# Patient Record
Sex: MALE | URBAN - METROPOLITAN AREA
[De-identification: names, ages, dates, MRNs, and addresses within clinical notes are randomized per-mention and may not be internally consistent; named-entity substitution may affect disease eponyms.]

---

## 2021-04-29 ENCOUNTER — APPOINTMENT (RX ONLY)
Dept: URBAN - METROPOLITAN AREA TELEMEDICINE 2 | Facility: TELEMEDICINE | Age: 46
Setting detail: DERMATOLOGY
End: 2021-04-29

## 2021-04-29 DIAGNOSIS — Z41.9 ENCOUNTER FOR PROCEDURE FOR PURPOSES OTHER THAN REMEDYING HEALTH STATE, UNSPECIFIED: ICD-10-CM

## 2021-04-29 PROCEDURE — ? COOLSCULPTING

## 2021-04-29 PROCEDURE — ? MEDICAL CONSULTATION: COOLSCULPTING

## 2021-04-29 PROCEDURE — ? RECOMMENDATIONS

## 2021-04-29 PROCEDURE — ? COSMETIC CONSULTATION: COOLSCULPTING

## 2021-04-29 ASSESSMENT — LOCATION DETAILED DESCRIPTION DERM
LOCATION DETAILED: RIGHT INFERIOR LATERAL MIDBACK
LOCATION DETAILED: RIGHT INFERIOR LATERAL MIDBACK
LOCATION DETAILED: RIGHT SUPERIOR LATERAL LOWER BACK
LOCATION DETAILED: LEFT INFERIOR MEDIAL MIDBACK
LOCATION DETAILED: RIGHT INFERIOR LATERAL MIDBACK
LOCATION DETAILED: LEFT SUPERIOR LATERAL LOWER BACK

## 2021-04-29 ASSESSMENT — LOCATION SIMPLE DESCRIPTION DERM
LOCATION SIMPLE: LEFT LOWER BACK
LOCATION SIMPLE: LEFT LOWER BACK
LOCATION SIMPLE: RIGHT LOWER BACK

## 2021-04-29 ASSESSMENT — LOCATION ZONE DERM
LOCATION ZONE: TRUNK

## 2021-04-29 NOTE — HPI: UNWANTED FAT
Are You Taking Immunosuppressants?: No
Additional History: * concerned with back fat\\n5/15 andJuly 14\\n*Hernia on abdomen

## 2021-04-29 NOTE — PROCEDURE: COOLSCULPTING
Suction Settings: The suction settings were per protocol.\\n*sucessful tx
Consent: Written consent obtained, risks reviewed including, but not limited to, blistering from suction, darker or lighter pigmentary change, bruising, and/or need for multiple treatments.
Intro: Prior to treatment, the area was cleaned with alcohol and marked out with a marking pen. The gel sheet was then applied uniformly. The applicator was applied to the skin with good contact and suction.
Suction Settings: The suction settings were per protocol.\\n*unSuccessful Tx. Skin was warm. Retreated on other machine
Device: Coolsculpting
Time (Minutes - Will Only Render If Nonzero): 35
Applicator Size: standard applicator
Suction Settings: The suction settings were per protocol.\\n*sucessful tx, skin tolerated treatment, pink in color
Suction Settings: The suction settings were per protocol.
Post Treatment: After treatment, the suction was turned off, and the applicator was removed from the skin.\\n\\n*pt.responded well, swelling, bruising, and erythema.\\n* warm towel, bruise moisturizer, body tight.
Patient Weight (Optional): 208
Time (Minutes - Will Only Render If Nonzero): 0
Price (Use Numbers Only, No Special Characters Or $): 431
Treatment Administered By (Optional): Sabra Mcdonald
Applicator Size: CoolAdvantage Core
Detail Level: Zone
Suction Settings: The suction settings were per protocol.\\n* Successful tx.
Applicator Size: CoolCore applicator
Applicator Size: CoolMini applicator
Start Time (Am Or Pm): 3:00
Price (Use Numbers Only, No Special Characters Or $): 4000
Start Time (Am Or Pm): 3:50
Stop Time (Am Or Pm): 3:35
Stop Time (Am Or Pm): 4:25
Applicator Size: CoolAdvantage Curve Plus
Location 1: upper flanks
Location 2: lower flanks

## 2021-05-27 ENCOUNTER — APPOINTMENT (RX ONLY)
Dept: URBAN - METROPOLITAN AREA TELEMEDICINE 2 | Facility: TELEMEDICINE | Age: 46
Setting detail: DERMATOLOGY
End: 2021-05-27

## 2021-05-27 DIAGNOSIS — Z41.9 ENCOUNTER FOR PROCEDURE FOR PURPOSES OTHER THAN REMEDYING HEALTH STATE, UNSPECIFIED: ICD-10-CM

## 2021-05-27 PROCEDURE — ? COOLSCULPTING

## 2021-05-27 ASSESSMENT — LOCATION SIMPLE DESCRIPTION DERM
LOCATION SIMPLE: LEFT ANTERIOR NECK
LOCATION SIMPLE: LEFT LOWER BACK
LOCATION SIMPLE: RIGHT LOWER BACK

## 2021-05-27 ASSESSMENT — LOCATION ZONE DERM
LOCATION ZONE: NECK
LOCATION ZONE: TRUNK

## 2021-05-27 ASSESSMENT — LOCATION DETAILED DESCRIPTION DERM
LOCATION DETAILED: LEFT SUPERIOR ANTERIOR NECK
LOCATION DETAILED: LEFT SUPERIOR LATERAL MIDBACK
LOCATION DETAILED: RIGHT INFERIOR LATERAL MIDBACK

## 2021-05-27 NOTE — PROCEDURE: COOLSCULPTING
Applicator Size: CoolMini applicator
Suction Settings: The suction settings were per protocol.
Suction Settings: The suction settings were per protocol.\\n*unSuccessful Tx. Thermal event
Suction Settings: The suction settings were per protocol.\\n*sucessful tx
Applicator Size: standard applicator
Location 1: flank (left)
Detail Level: Zone
Location 3: chin
Suction Settings: The suction settings were per protocol.\\n*sucessful tx, skin tolerated treatment, pink in color
Time (Minutes - Will Only Render If Nonzero): 35
Location 2: flank (right)
Treatment Administered By (Optional): Sabra Mcdonald
Post Treatment: After treatment, the suction was turned off, and the applicator was removed from the skin.\\n\\n*pt.responded well, swelling, bruising, and erythema.\\n* warm towel, bruise moisturizer, body tight.\\n* given CS bag, and gratis nectifirm
Device: Coolsculpting
Time (Minutes - Will Only Render If Nonzero): 45
Time (Minutes - Will Only Render If Nonzero): 0
Start Time (Am Or Pm): 10:00
Start Time (Am Or Pm): 11:00
Price (Use Numbers Only, No Special Characters Or $): 4000
Consent: Written consent obtained, risks reviewed including, but not limited to, blistering from suction, darker or lighter pigmentary change, bruising, and/or need for multiple treatments.
Stop Time (Am Or Pm): 10:35
Intro: Prior to treatment, the area was cleaned with alcohol and marked out with a marking pen. The gel sheet was then applied uniformly. The applicator was applied to the skin with good contact and suction.
Start Time (Am Or Pm): 11:50
Suction Settings: The suction settings were per protocol.\\n* Successful tx.
Stop Time (Am Or Pm): 12:25
Stop Time (Am Or Pm): 11:35
Applicator Size: CoolAdvantage Core

## 2021-06-27 NOTE — PROCEDURE: RECOMMENDATIONS
Recommendation Preamble: The following recommendations were made during the visit:
Detail Level: Zone
Pain - Patient was clinically upgraded due to pain.

## 2021-07-22 ENCOUNTER — APPOINTMENT (RX ONLY)
Dept: URBAN - METROPOLITAN AREA TELEMEDICINE 2 | Facility: TELEMEDICINE | Age: 46
Setting detail: DERMATOLOGY
End: 2021-07-22

## 2021-07-22 DIAGNOSIS — Z41.9 ENCOUNTER FOR PROCEDURE FOR PURPOSES OTHER THAN REMEDYING HEALTH STATE, UNSPECIFIED: ICD-10-CM

## 2021-07-22 PROCEDURE — ? COOLSCULPTING

## 2021-07-22 ASSESSMENT — LOCATION SIMPLE DESCRIPTION DERM
LOCATION SIMPLE: LEFT CHEEK
LOCATION SIMPLE: LEFT LOWER BACK
LOCATION SIMPLE: ABDOMEN
LOCATION SIMPLE: LEFT LOWER BACK
LOCATION SIMPLE: RIGHT LOWER BACK

## 2021-07-22 ASSESSMENT — LOCATION ZONE DERM
LOCATION ZONE: TRUNK
LOCATION ZONE: TRUNK
LOCATION ZONE: FACE

## 2021-07-22 ASSESSMENT — LOCATION DETAILED DESCRIPTION DERM
LOCATION DETAILED: RIGHT LATERAL ABDOMEN
LOCATION DETAILED: PERIUMBILICAL SKIN
LOCATION DETAILED: LEFT INFERIOR MEDIAL MALAR CHEEK
LOCATION DETAILED: LEFT INFERIOR LATERAL MIDBACK
LOCATION DETAILED: LEFT INFERIOR LATERAL MIDBACK
LOCATION DETAILED: RIGHT INFERIOR LATERAL MIDBACK

## 2021-07-22 NOTE — PROCEDURE: COOLSCULPTING
Suction Settings: The suction settings were per protocol.\\n*sucessful tx, skin tolerated treatment, pink in color
Suction Settings: The suction settings were per protocol.\\n*unSuccessful Tx. Thermal event
Post Treatment: After treatment, the suction was turned off, and the applicator was removed from the skin.\\n\\n*successful treatment
Applicator Size: standard applicator
Location 2: flank (right)
Time (Minutes - Will Only Render If Nonzero): 35
Applicator Size: CoolMini applicator
Device: Coolsculpting
Time (Minutes - Will Only Render If Nonzero): 0
Suction Settings: The suction settings were per protocol.\\n*successful tx.
Location 1: flank (left)
Applicator Size: CoolAdvantage Curve Plus
Intro: Prior to treatment, the area was cleaned with alcohol and marked out with a marking pen. The gel sheet was then applied uniformly. The applicator was applied to the skin with good contact and suction.
Suction Settings: The suction settings were per protocol.\\n* Successful tx.
Detail Level: Zone
Stop Time (Am Or Pm): 9:48
Applicator Size: CoolAdvantage Curve
Suction Settings: The suction settings were per protocol.
Consent: Written consent obtained, risks reviewed including, but not limited to, blistering from suction, darker or lighter pigmentary change, bruising, and/or need for multiple treatments.
Stop Time (Am Or Pm): 9:10
Treatment Administered By (Optional): Sabra Mcdonald
Start Time (Am Or Pm): 9:13
Start Time (Am Or Pm): 8:25